# Patient Record
Sex: MALE | Race: WHITE | URBAN - METROPOLITAN AREA
[De-identification: names, ages, dates, MRNs, and addresses within clinical notes are randomized per-mention and may not be internally consistent; named-entity substitution may affect disease eponyms.]

---

## 2021-11-25 ENCOUNTER — APPOINTMENT (OUTPATIENT)
Dept: GENERAL RADIOLOGY | Age: 26
End: 2021-11-25
Payer: COMMERCIAL

## 2021-11-25 ENCOUNTER — HOSPITAL ENCOUNTER (EMERGENCY)
Age: 26
Discharge: HOME | End: 2021-11-25
Payer: COMMERCIAL

## 2021-11-25 VITALS
OXYGEN SATURATION: 97 % | RESPIRATION RATE: 18 BRPM | DIASTOLIC BLOOD PRESSURE: 70 MMHG | HEART RATE: 93 BPM | WEIGHT: 210 LBS | BODY MASS INDEX: 31.1 KG/M2 | SYSTOLIC BLOOD PRESSURE: 134 MMHG | TEMPERATURE: 99.1 F | HEIGHT: 69 IN

## 2021-11-25 DIAGNOSIS — L03.119 CELLULITIS AND ABSCESS OF UPPER EXTREMITY: Primary | ICD-10-CM

## 2021-11-25 DIAGNOSIS — L02.419 CELLULITIS AND ABSCESS OF UPPER EXTREMITY: Primary | ICD-10-CM

## 2021-11-25 PROCEDURE — 26010 DRAINAGE OF FINGER ABSCESS: CPT

## 2021-11-25 PROCEDURE — 99283 EMERGENCY DEPT VISIT LOW MDM: CPT

## 2021-11-25 PROCEDURE — 2500000003 HC RX 250 WO HCPCS: Performed by: NURSE PRACTITIONER

## 2021-11-25 PROCEDURE — 6370000000 HC RX 637 (ALT 250 FOR IP): Performed by: NURSE PRACTITIONER

## 2021-11-25 PROCEDURE — 73140 X-RAY EXAM OF FINGER(S): CPT

## 2021-11-25 RX ORDER — SULFAMETHOXAZOLE AND TRIMETHOPRIM 800; 160 MG/1; MG/1
2 TABLET ORAL ONCE
Status: COMPLETED | OUTPATIENT
Start: 2021-11-25 | End: 2021-11-25

## 2021-11-25 RX ORDER — SULFAMETHOXAZOLE AND TRIMETHOPRIM 800; 160 MG/1; MG/1
2 TABLET ORAL 2 TIMES DAILY
Qty: 28 TABLET | Refills: 0 | Status: SHIPPED | OUTPATIENT
Start: 2021-11-25 | End: 2021-12-02

## 2021-11-25 RX ORDER — IBUPROFEN 600 MG/1
600 TABLET ORAL EVERY 6 HOURS PRN
Qty: 28 TABLET | Refills: 0 | Status: SHIPPED | OUTPATIENT
Start: 2021-11-25 | End: 2021-12-02

## 2021-11-25 RX ORDER — CEFUROXIME AXETIL 500 MG/1
500 TABLET ORAL 2 TIMES DAILY
Qty: 14 TABLET | Refills: 0 | Status: SHIPPED | OUTPATIENT
Start: 2021-11-25 | End: 2021-12-02

## 2021-11-25 RX ORDER — LIDOCAINE HYDROCHLORIDE 10 MG/ML
5 INJECTION, SOLUTION INFILTRATION; PERINEURAL ONCE
Status: COMPLETED | OUTPATIENT
Start: 2021-11-25 | End: 2021-11-25

## 2021-11-25 RX ADMIN — LIDOCAINE HYDROCHLORIDE 5 ML: 10 INJECTION, SOLUTION INFILTRATION; PERINEURAL at 20:28

## 2021-11-25 RX ADMIN — SULFAMETHOXAZOLE AND TRIMETHOPRIM 2 TABLET: 800; 160 TABLET ORAL at 20:28

## 2021-11-25 ASSESSMENT — PAIN DESCRIPTION - ORIENTATION: ORIENTATION: RIGHT

## 2021-11-25 ASSESSMENT — PAIN DESCRIPTION - DESCRIPTORS: DESCRIPTORS: ACHING

## 2021-11-25 ASSESSMENT — PAIN DESCRIPTION - LOCATION: LOCATION: HAND

## 2021-11-25 ASSESSMENT — PAIN SCALES - GENERAL: PAINLEVEL_OUTOF10: 6

## 2021-11-25 ASSESSMENT — PAIN DESCRIPTION - PAIN TYPE: TYPE: ACUTE PAIN

## 2021-11-25 NOTE — Clinical Note
Olivia Earl was seen and treated in our emergency department on 11/25/2021. He may return to work on 11/27/2021. If you have any questions or concerns, please don't hesitate to call.       Jennifer Manley, APRN - CNP

## 2021-11-26 NOTE — ED PROVIDER NOTES
[address]  Department of Emergency Medicine   ED  Encounter Note  Admit Date/RoomTime: 2021  7:42 PM  ED Room:     NAME: Elverna Pallas  : 1995  MRN: [id number]     Chief Complaint:  Hand Pain (Pain, redness, swelling of right hand 5th finger started Tuesday)    History of Present Illness       Elverna Pallas is a 27 y.o. old male who presents to the emergency department for evaluation of right fifth finger redness and swelling that began 2 days ago. He states it started out as an ingrown hair on his finger that progressed to become painful and spontaneous yellow drainage today. He has been cleaning it with hydrogen peroxide with minimal improvement. He is right-hand dominant and denies any associated fever, chills, myalgias or malaise. He reports that his tetanus vaccination to be up-to-date within the last 5 years. His symptoms are aggravated by palpation and unalleviated. His symptoms are moderate in severity and persistent nature. **Informed Consent**    The patient has given verbal consent to have photos taken of right fifth finger and electronically inserted into their ED Provider Note as part of their permanent medical record for purposes of illustration, documentation, treatment management and/or medical review. All Images taken on 21 of patient name: Elverna Pallas were taken by a [address] [place] [address],[address] approved registered mobile device via [employer] mobile application and transmitted then stored on a secured [employer] located within [facility]. ROS   Pertinent positives and negatives are stated within HPI, all other systems reviewed and are negative. Past Medical History:  has a past medical history of Acne. Surgical History:  has a past surgical history that includes hernia repair. Social History:  reports that he has never smoked.  He has never used smokeless tobacco. He reports that he does not drink alcohol and does not use drugs. Family History: family history is not on file. Allergies: Patient has no known allergies. Physical Exam   Oxygen Saturation Interpretation: Normal.        ED Triage Vitals [11/25/21 1946]   BP Temp Temp Source Pulse Resp SpO2 Height Weight   134/70 99.1 °F (37.3 °C) Oral 93 18 97 % 5' 9\" (1.753 m) 210 lb (95.3 kg)         · Constitutional:  Alert, development consistent with age. · HEENT:  NC/NT. No outward sign of trauma. Airway patent. · Neck:  Normal ROM. Supple. · Respiratory: No respiratory distress or increased work of breathing. · Cardiovascular: Regular rate and rhythm. Strong distal pulses. · Right Upper Extremity(s): Little finger proximal phalanx dorsal aspect. Tenderness: Moderate soft tissue tenderness with 5 cm x 4 cm erythema, warmth and swelling with compartments soft, there is a pustule and fluctuance with yellow purulent drainage on the dorsal aspect consistent with abscess. The compartments are soft and the erythema is not circumferential.  There is no lymphangitis. There is no bony tenderness through the metacarpals of the hand or distal aspect of the phalange including the PIP and DIP. Deformity: no deformity observed/palpated. ROM: full range with mild pain. Neurovascular: Motor deficit: none. Sensory deficit: none. Pulse deficit: none. Capillary refill: normal.  · Neurological:  Alert and oriented. Motor and sensory functions intact unless otherwise described above. Lab / Imaging Results   (All laboratory and radiology results have been personally reviewed by myself)  Labs:  No results found for this visit on 11/25/21. Imaging: All Radiology results interpreted by Radiologist unless otherwise noted.   XR FINGER RIGHT (MIN 2 VIEWS)   Final Result   Soft tissue swelling about the right long finger. No acute osseous findings. No radiopaque foreign body seen. ED Course / Medical Decision Making     Medications   lidocaine 1 % injection 5 mL (5 mLs IntraDERmal Given 11/25/21 2028)   sulfamethoxazole-trimethoprim (BACTRIM DS;SEPTRA DS) 800-160 MG per tablet 2 tablet (2 tablets Oral Given 11/25/21 2028)     Re-examination:  11/25/21     Time: 2040  Patients symptoms are improving after I&D. Repeat physical examination has improved as his ROM is less painful after I&D. Discussed results which wet read x-ray shows not obvious gas and treatment plan. Consult:   None    Procedure(s):  PROCEDURE  11/25/21       Time: 2035    INCISION AND DRAINAGE  Risks, benefits and alternatives (for applicable procedures below) described. Performed By: FIORELLA Lopez CNP. Indication: Abscess located on fifth right finger  Informed consent obtained: The patient was counseled regarding the procedure, it's indications, risks, potential complications and alternatives and any questions were answered. Consent was obtained. .  Prep: The skin was cleansed with povidone iodine and draped in a sterile fashion. Local Anesthesia:  obtained with Lidocaine 1% without epinephrine. Incision: The Abscess located on fifth right finger was Incised by scalpal and moderate fluid was drained. A wound culture was not obtained. The wound  was irrigated and was not packed with iodoform gauze. The wound was then covered with a sterile dressing. Patient tolerated the procedure well. MDM:    Neurovascularly intact. Imaging was obtained based as per history and physical exam findings. Interpretation as per radiologist negative for fracture, foreign body or gas. Incision and drainage was completed with improvement in his symptoms. There is no lymphangitis or clinical evidence of tenosynovitis.   Plan is subsequently for symptom control with Motrin and he was started empirically on Bactrim and Ceftin and with